# Patient Record
Sex: MALE | Race: ASIAN | NOT HISPANIC OR LATINO | Employment: UNEMPLOYED | URBAN - METROPOLITAN AREA
[De-identification: names, ages, dates, MRNs, and addresses within clinical notes are randomized per-mention and may not be internally consistent; named-entity substitution may affect disease eponyms.]

---

## 2018-10-29 ENCOUNTER — HOSPITAL ENCOUNTER (EMERGENCY)
Facility: HOSPITAL | Age: 23
Discharge: HOME | End: 2018-10-29
Attending: EMERGENCY MEDICINE
Payer: MEDICAID

## 2018-10-29 VITALS
HEIGHT: 64 IN | OXYGEN SATURATION: 100 % | DIASTOLIC BLOOD PRESSURE: 60 MMHG | RESPIRATION RATE: 18 BRPM | BODY MASS INDEX: 25.78 KG/M2 | TEMPERATURE: 99 F | WEIGHT: 151 LBS | HEART RATE: 80 BPM | SYSTOLIC BLOOD PRESSURE: 133 MMHG

## 2018-10-29 DIAGNOSIS — F15.10 AMPHETAMINE ABUSE: Primary | ICD-10-CM

## 2018-10-29 DIAGNOSIS — R44.3 HALLUCINATIONS: ICD-10-CM

## 2018-10-29 LAB
ALBUMIN SERPL BCP-MCNC: 5.2 G/DL
ALP SERPL-CCNC: 53 U/L
ALT SERPL W/O P-5'-P-CCNC: 17 U/L
AMPHET+METHAMPHET UR QL: NORMAL
ANION GAP SERPL CALC-SCNC: 16 MMOL/L
AST SERPL-CCNC: 31 U/L
BARBITURATES UR QL SCN>200 NG/ML: NEGATIVE
BASOPHILS # BLD AUTO: 0.01 K/UL
BASOPHILS NFR BLD: 0.1 %
BENZODIAZ UR QL SCN>200 NG/ML: NEGATIVE
BILIRUB SERPL-MCNC: 1.5 MG/DL
BUN SERPL-MCNC: 14 MG/DL
BZE UR QL SCN: NEGATIVE
CALCIUM SERPL-MCNC: 10.1 MG/DL
CANNABINOIDS UR QL SCN: NEGATIVE
CHLORIDE SERPL-SCNC: 100 MMOL/L
CO2 SERPL-SCNC: 20 MMOL/L
CREAT SERPL-MCNC: 1 MG/DL
CREAT UR-MCNC: 148.2 MG/DL
DIFFERENTIAL METHOD: ABNORMAL
EOSINOPHIL # BLD AUTO: 0 K/UL
EOSINOPHIL NFR BLD: 0 %
ERYTHROCYTE [DISTWIDTH] IN BLOOD BY AUTOMATED COUNT: 15.1 %
EST. GFR  (AFRICAN AMERICAN): >60 ML/MIN/1.73 M^2
EST. GFR  (NON AFRICAN AMERICAN): >60 ML/MIN/1.73 M^2
ETHANOL SERPL-MCNC: <10 MG/DL
GLUCOSE SERPL-MCNC: 140 MG/DL
HCT VFR BLD AUTO: 40.3 %
HGB BLD-MCNC: 13.1 G/DL
LYMPHOCYTES # BLD AUTO: 1.3 K/UL
LYMPHOCYTES NFR BLD: 14.4 %
MCH RBC QN AUTO: 21.4 PG
MCHC RBC AUTO-ENTMCNC: 32.5 G/DL
MCV RBC AUTO: 66 FL
METHADONE UR QL SCN>300 NG/ML: NEGATIVE
MONOCYTES # BLD AUTO: 0.7 K/UL
MONOCYTES NFR BLD: 8.1 %
NEUTROPHILS # BLD AUTO: 7.1 K/UL
NEUTROPHILS NFR BLD: 77.6 %
OPIATES UR QL SCN: NEGATIVE
PCP UR QL SCN>25 NG/ML: NEGATIVE
PLATELET # BLD AUTO: 270 K/UL
PMV BLD AUTO: 9.8 FL
POTASSIUM SERPL-SCNC: 3 MMOL/L
PROT SERPL-MCNC: 9.2 G/DL
RBC # BLD AUTO: 6.13 M/UL
SODIUM SERPL-SCNC: 136 MMOL/L
TOXICOLOGY INFORMATION: NORMAL
TSH SERPL DL<=0.005 MIU/L-ACNC: 1.39 UIU/ML
WBC # BLD AUTO: 9.11 K/UL

## 2018-10-29 PROCEDURE — 96361 HYDRATE IV INFUSION ADD-ON: CPT

## 2018-10-29 PROCEDURE — 63600175 PHARM REV CODE 636 W HCPCS: Performed by: EMERGENCY MEDICINE

## 2018-10-29 PROCEDURE — 80053 COMPREHEN METABOLIC PANEL: CPT

## 2018-10-29 PROCEDURE — 25000003 PHARM REV CODE 250: Performed by: EMERGENCY MEDICINE

## 2018-10-29 PROCEDURE — 84443 ASSAY THYROID STIM HORMONE: CPT

## 2018-10-29 PROCEDURE — 80320 DRUG SCREEN QUANTALCOHOLS: CPT

## 2018-10-29 PROCEDURE — 96374 THER/PROPH/DIAG INJ IV PUSH: CPT

## 2018-10-29 PROCEDURE — 80307 DRUG TEST PRSMV CHEM ANLYZR: CPT

## 2018-10-29 PROCEDURE — 99284 EMERGENCY DEPT VISIT MOD MDM: CPT | Mod: 25

## 2018-10-29 PROCEDURE — 85025 COMPLETE CBC W/AUTO DIFF WBC: CPT

## 2018-10-29 RX ORDER — ALPRAZOLAM 0.5 MG/1
2 TABLET ORAL
Status: DISCONTINUED | OUTPATIENT
Start: 2018-10-29 | End: 2018-10-29

## 2018-10-29 RX ORDER — OLANZAPINE 5 MG/1
10 TABLET, ORALLY DISINTEGRATING ORAL NIGHTLY
Status: DISCONTINUED | OUTPATIENT
Start: 2018-10-29 | End: 2018-10-29 | Stop reason: HOSPADM

## 2018-10-29 RX ORDER — LORAZEPAM 2 MG/ML
2 INJECTION INTRAMUSCULAR
Status: COMPLETED | OUTPATIENT
Start: 2018-10-29 | End: 2018-10-29

## 2018-10-29 RX ORDER — OLANZAPINE 5 MG/1
10 TABLET, ORALLY DISINTEGRATING ORAL NIGHTLY
Status: DISCONTINUED | OUTPATIENT
Start: 2018-10-29 | End: 2018-10-29

## 2018-10-29 RX ADMIN — SODIUM CHLORIDE 1000 ML: 0.9 INJECTION, SOLUTION INTRAVENOUS at 03:10

## 2018-10-29 RX ADMIN — LORAZEPAM 2 MG: 2 INJECTION INTRAMUSCULAR; INTRAVENOUS at 03:10

## 2018-10-29 RX ADMIN — OLANZAPINE 10 MG: 5 TABLET, ORALLY DISINTEGRATING ORAL at 03:10

## 2018-10-29 NOTE — ED PROVIDER NOTES
"Encounter Date: 10/29/2018       History     Chief Complaint   Patient presents with    Paranoid     Pt reports feeling Paranoid.  States he used "Crystal Meth" yesterday.       Patient presents c/o paranoia since doing methamphetamine yesterday. States that he is hearing voices and thinks someone is watching him. No other drug use. He drank alcohol yesterday. He has had similar symptoms in past associated with drug use. Admits to h/o anxiety. He is on no prescription medications. He hs not slept in 2 days.       The history is provided by the patient.   General Illness    The current episode started yesterday. The problem occurs frequently. The problem has been gradually worsening. Nothing relieves the symptoms. Nothing aggravates the symptoms. Pertinent negatives include no fever, no double vision, no abdominal pain, no diarrhea, no nausea, no vomiting, no congestion, no headaches, no sore throat, no muscle aches, no neck pain, no cough, no shortness of breath and no pain.     Review of patient's allergies indicates:  No Known Allergies  History reviewed. No pertinent past medical history.  History reviewed. No pertinent surgical history.  History reviewed. No pertinent family history.  Social History     Tobacco Use    Smoking status: Current Every Day Smoker     Types: Cigarettes    Smokeless tobacco: Current User   Substance Use Topics    Alcohol use: No     Frequency: Never    Drug use: Yes     Frequency: 2.0 times per week     Types: Methamphetamines     Review of Systems   Constitutional: Negative for chills, diaphoresis and fever.   HENT: Negative for congestion and sore throat.    Eyes: Negative.  Negative for double vision, pain and visual disturbance.   Respiratory: Negative for cough, chest tightness and shortness of breath.    Cardiovascular: Negative for chest pain and palpitations.   Gastrointestinal: Negative for abdominal pain, diarrhea, nausea and vomiting.        NO melena or rectal bleeding "   Musculoskeletal: Negative for arthralgias, back pain and neck pain.   Skin: Negative for wound.   Neurological: Negative for dizziness, syncope, speech difficulty, weakness, numbness and headaches.        No numbness   Psychiatric/Behavioral: Positive for confusion, hallucinations and sleep disturbance. Negative for agitation, self-injury and suicidal ideas. The patient is nervous/anxious. The patient is not hyperactive.    All other systems reviewed and are negative.      Physical Exam     Initial Vitals [10/29/18 0234]   BP Pulse Resp Temp SpO2   (!) 174/86 (!) 124 18 98.3 °F (36.8 °C) 99 %      MAP       --         Physical Exam    Nursing note and vitals reviewed.  Constitutional: He appears well-developed and well-nourished. He is not diaphoretic. No distress.   HENT:   Head: Normocephalic and atraumatic.   Mouth/Throat: Oropharynx is clear and moist.   Eyes: Conjunctivae and EOM are normal. Pupils are equal, round, and reactive to light. Right eye exhibits no discharge. Left eye exhibits no discharge. No scleral icterus.   Neck: Neck supple. No tracheal deviation present.   Cardiovascular: Regular rhythm and normal heart sounds.   No murmur heard.  Tachycardic.   Pulmonary/Chest: Breath sounds normal. No stridor. No respiratory distress. He has no wheezes. He has no rhonchi. He has no rales. He exhibits no tenderness.   Abdominal: Soft. He exhibits no distension and no mass. There is no tenderness. There is no rebound and no guarding.   Musculoskeletal: Normal range of motion. He exhibits no edema or tenderness.   Neurological: He is alert and oriented to person, place, and time. He has normal strength. No cranial nerve deficit.   Skin: Skin is warm and dry. No rash noted.   Psychiatric:   Anxious. Flat affect. Admits to auditory hallucinations. No SI or HI.          ED Course   Procedures  Labs Reviewed   CBC W/ AUTO DIFFERENTIAL - Abnormal; Notable for the following components:       Result Value     Hemoglobin 13.1 (*)     MCV 66 (*)     MCH 21.4 (*)     RDW 15.1 (*)     Gran% 77.6 (*)     Lymph% 14.4 (*)     All other components within normal limits   COMPREHENSIVE METABOLIC PANEL - Abnormal; Notable for the following components:    Potassium 3.0 (*)     CO2 20 (*)     Glucose 140 (*)     Total Protein 9.2 (*)     Total Bilirubin 1.5 (*)     Alkaline Phosphatase 53 (*)     All other components within normal limits   TSH   DRUG SCREEN PANEL, URINE EMERGENCY   ALCOHOL,MEDICAL (ETHANOL)          Imaging Results    None          Medical Decision Making:   Differential Diagnosis:   Drug abuse, psychosis  Clinical Tests:   Lab Tests: Reviewed  ED Management:  0400: Patient sleeping after medications. HR normal. Will OBS. Suspect symptoms due to drug abuse. Once awake, if he is still exhibiting symptoms of psychosis he may need to be committed for psychiatric evaluation.     0600: Patient signed out to Dr. Meadows at shift change for further OBS and repeat evaluation.     I received sign-out on the patient by previous provider.  Please look at his note above.  S per sign out, patient came in secondary to hallucinations after using methamphetamines with most likely the reason on hallucinations being his drug use.  Patient has been observed in the emergency department now for multiple hours with normalized vitals and not hypoxic after sedative medications.  Pending urine drug screen on the patient.  Reviewed his current labs.    6:30 AM  Patient is sleeping comfortably    7:10 AM  Patient is sleeping comfortably    9:00 AM  Patient eloped more awake after receiving sedative medications previously by previous provider.  Patient denies any suicidal ideation, homicidal ideation, or hallucinations.  Will have patient tolerate p.o. and ambulate.  If able to will be discharged with outpatient drug abuse resources.  I instructed the patient to please stop any drug use his inhaler time get counseling and follow up with primary  care.  I did give the patient a primary care physician.  Does not fit PEC at this time    Patient's tolerate p.o. and is ambulating.  To be discharged.  No complaints.    I discussed with the patient the diagnosis, treatment plan, indications for return to the emergency department, and for expected follow-up. The patient verbalized an understanding. The patient is asked if there are any questions or concerns. We discuss the case, until all issues are addressed to the patient's satisfaction. Patient understands and is agreeable to the plan.   Ga Meadows                          Clinical Impression:   The primary encounter diagnosis was Amphetamine abuse. A diagnosis of Hallucinations was also pertinent to this visit.                             Ga Meadows MD  10/29/18 1002

## 2018-10-29 NOTE — ED TRIAGE NOTES
"Patient arrived to Ed from home via personal transportation. Pt stated "I'm paranoid , I took Crystal Meth." Denies any vomiting, nausea.  "

## 2018-10-29 NOTE — ED NOTES
First contact with patient, patient is asleep, arouses to voice.  Pt's VSS, and patient is in no acute distress at this time.

## 2019-06-04 ENCOUNTER — HOSPITAL ENCOUNTER (EMERGENCY)
Facility: HOSPITAL | Age: 24
Discharge: HOME | End: 2019-06-05
Attending: EMERGENCY MEDICINE
Payer: MEDICAID

## 2019-06-04 DIAGNOSIS — R00.0 TACHYCARDIA: ICD-10-CM

## 2019-06-04 DIAGNOSIS — F14.920 COCAINE INTOXICATION WITHOUT COMPLICATION: Primary | ICD-10-CM

## 2019-06-04 PROCEDURE — 93010 ELECTROCARDIOGRAM REPORT: CPT | Mod: ,,, | Performed by: INTERNAL MEDICINE

## 2019-06-04 PROCEDURE — 96361 HYDRATE IV INFUSION ADD-ON: CPT

## 2019-06-04 PROCEDURE — 93010 EKG 12-LEAD: ICD-10-PCS | Mod: ,,, | Performed by: INTERNAL MEDICINE

## 2019-06-04 PROCEDURE — 96376 TX/PRO/DX INJ SAME DRUG ADON: CPT

## 2019-06-04 PROCEDURE — 93005 ELECTROCARDIOGRAM TRACING: CPT

## 2019-06-04 PROCEDURE — 99285 EMERGENCY DEPT VISIT HI MDM: CPT | Mod: 25

## 2019-06-04 PROCEDURE — 96374 THER/PROPH/DIAG INJ IV PUSH: CPT

## 2019-06-04 RX ORDER — LORAZEPAM 2 MG/ML
2 INJECTION INTRAMUSCULAR
Status: COMPLETED | OUTPATIENT
Start: 2019-06-05 | End: 2019-06-05

## 2019-06-05 VITALS
HEIGHT: 64 IN | RESPIRATION RATE: 18 BRPM | OXYGEN SATURATION: 99 % | SYSTOLIC BLOOD PRESSURE: 116 MMHG | BODY MASS INDEX: 22.2 KG/M2 | DIASTOLIC BLOOD PRESSURE: 72 MMHG | WEIGHT: 130 LBS | TEMPERATURE: 98 F | HEART RATE: 71 BPM

## 2019-06-05 LAB
ALBUMIN SERPL BCP-MCNC: 4.7 G/DL (ref 3.5–5.2)
ALP SERPL-CCNC: 50 U/L (ref 55–135)
ALT SERPL W/O P-5'-P-CCNC: 14 U/L (ref 10–44)
AMPHET+METHAMPHET UR QL: NEGATIVE
ANION GAP SERPL CALC-SCNC: 13 MMOL/L (ref 8–16)
AST SERPL-CCNC: 21 U/L (ref 10–40)
BARBITURATES UR QL SCN>200 NG/ML: NEGATIVE
BASOPHILS # BLD AUTO: 0.01 K/UL (ref 0–0.2)
BASOPHILS NFR BLD: 0.1 % (ref 0–1.9)
BENZODIAZ UR QL SCN>200 NG/ML: NEGATIVE
BILIRUB SERPL-MCNC: 0.6 MG/DL (ref 0.1–1)
BUN SERPL-MCNC: 11 MG/DL (ref 6–20)
BZE UR QL SCN: NORMAL
CALCIUM SERPL-MCNC: 9.5 MG/DL (ref 8.7–10.5)
CANNABINOIDS UR QL SCN: NEGATIVE
CHLORIDE SERPL-SCNC: 102 MMOL/L (ref 95–110)
CO2 SERPL-SCNC: 22 MMOL/L (ref 23–29)
CREAT SERPL-MCNC: 1.1 MG/DL (ref 0.5–1.4)
CREAT UR-MCNC: 67.5 MG/DL (ref 23–375)
DIFFERENTIAL METHOD: ABNORMAL
EOSINOPHIL # BLD AUTO: 0 K/UL (ref 0–0.5)
EOSINOPHIL NFR BLD: 0 % (ref 0–8)
ERYTHROCYTE [DISTWIDTH] IN BLOOD BY AUTOMATED COUNT: 13.8 % (ref 11.5–14.5)
EST. GFR  (AFRICAN AMERICAN): >60 ML/MIN/1.73 M^2
EST. GFR  (NON AFRICAN AMERICAN): >60 ML/MIN/1.73 M^2
ETHANOL SERPL-MCNC: <10 MG/DL
GLUCOSE SERPL-MCNC: 209 MG/DL (ref 70–110)
HCT VFR BLD AUTO: 39.6 % (ref 40–54)
HGB BLD-MCNC: 12.6 G/DL (ref 14–18)
HYPOCHROMIA BLD QL SMEAR: ABNORMAL
LYMPHOCYTES # BLD AUTO: 0.5 K/UL (ref 1–4.8)
LYMPHOCYTES NFR BLD: 7 % (ref 18–48)
MCH RBC QN AUTO: 21.8 PG (ref 27–31)
MCHC RBC AUTO-ENTMCNC: 31.8 G/DL (ref 32–36)
MCV RBC AUTO: 69 FL (ref 82–98)
METHADONE UR QL SCN>300 NG/ML: NEGATIVE
MONOCYTES # BLD AUTO: 0.2 K/UL (ref 0.3–1)
MONOCYTES NFR BLD: 3.2 % (ref 4–15)
NEUTROPHILS # BLD AUTO: 6.8 K/UL (ref 1.8–7.7)
NEUTROPHILS NFR BLD: 90 % (ref 38–73)
OPIATES UR QL SCN: NEGATIVE
OVALOCYTES BLD QL SMEAR: ABNORMAL
PCP UR QL SCN>25 NG/ML: NEGATIVE
PLATELET # BLD AUTO: 309 K/UL (ref 150–350)
PMV BLD AUTO: 9.4 FL (ref 9.2–12.9)
POLYCHROMASIA BLD QL SMEAR: ABNORMAL
POTASSIUM SERPL-SCNC: 3.7 MMOL/L (ref 3.5–5.1)
PROT SERPL-MCNC: 8.5 G/DL (ref 6–8.4)
RBC # BLD AUTO: 5.77 M/UL (ref 4.6–6.2)
SODIUM SERPL-SCNC: 137 MMOL/L (ref 136–145)
TARGETS BLD QL SMEAR: ABNORMAL
TOXICOLOGY INFORMATION: NORMAL
TROPONIN I SERPL DL<=0.01 NG/ML-MCNC: 0.01 NG/ML (ref 0–0.03)
WBC # BLD AUTO: 7.55 K/UL (ref 3.9–12.7)

## 2019-06-05 PROCEDURE — 80320 DRUG SCREEN QUANTALCOHOLS: CPT

## 2019-06-05 PROCEDURE — 80053 COMPREHEN METABOLIC PANEL: CPT

## 2019-06-05 PROCEDURE — 85025 COMPLETE CBC W/AUTO DIFF WBC: CPT

## 2019-06-05 PROCEDURE — 25000003 PHARM REV CODE 250: Performed by: EMERGENCY MEDICINE

## 2019-06-05 PROCEDURE — 84484 ASSAY OF TROPONIN QUANT: CPT

## 2019-06-05 PROCEDURE — 63600175 PHARM REV CODE 636 W HCPCS: Performed by: EMERGENCY MEDICINE

## 2019-06-05 PROCEDURE — 80307 DRUG TEST PRSMV CHEM ANLYZR: CPT

## 2019-06-05 RX ORDER — LORAZEPAM 2 MG/ML
2 INJECTION INTRAMUSCULAR
Status: COMPLETED | OUTPATIENT
Start: 2019-06-05 | End: 2019-06-05

## 2019-06-05 RX ADMIN — LORAZEPAM 2 MG: 2 INJECTION INTRAMUSCULAR; INTRAVENOUS at 12:06

## 2019-06-05 RX ADMIN — LORAZEPAM 2 MG: 2 INJECTION INTRAMUSCULAR; INTRAVENOUS at 01:06

## 2019-06-05 RX ADMIN — SODIUM CHLORIDE 1000 ML: 0.9 INJECTION, SOLUTION INTRAVENOUS at 12:06

## 2019-06-05 RX ADMIN — SODIUM CHLORIDE 1000 ML: 0.9 INJECTION, SOLUTION INTRAVENOUS at 02:06

## 2019-06-05 NOTE — ED NOTES
Pt AAOx4; pt speech clear and coherent; pt ambulates with slow steady gait, no assist needed; pt denies any complaints at this time; no distress noted;

## 2019-06-05 NOTE — ED NOTES
Pt reminded again about urine sample needed; pt reports that he does not need to urinate at this time but will try later

## 2019-06-05 NOTE — ED TRIAGE NOTES
"Pt reports to ED via personal transportation; pt reports that he wants to be arrested because he is recently homeless as of today; pt states "I have no where to go"; pt reports that he also smokes crack and last used about 1hr ago; pt reports that he wants to be arrested so he would have a place to stay and so he could have some help getting off the crack; pt also reports that he occasionally uses meth and drinks alcohol; pt denies hallucinations, SI, or HI at this time; pt denies any medical hx; pt tachycardic but denies any chest pain or SOB; pt calm, cooperative, and has flat affect; no acute distress noted;  "

## 2019-06-05 NOTE — ED PROVIDER NOTES
"Encounter Date: 6/4/2019       History     Chief Complaint   Patient presents with    Psychiatric Evaluation     "I want to turn myself in to the police, I smoked crack, I need help."  Denies SI or HI     Patient presents asking for help getting off crack cocaine.  He states that he last used about 1 hr ago.  States using crack cocaine for days.  States he does not have anywhere to stay.  Patient is asking us to get have him arrested and put in group home soaking of crack and had a place to sleep. He denies alcohol intoxication.  He denies using any other drugs tonight.  He denies chest pain. Denies shortness of breath. He denies nausea or vomiting. Denies headache. Denies abdominal pain. Patient denies suicidal ideations.  He denies homicidal ideations.  He denies auditory or visual hallucinations.  denies any psychiatric history.  Patient states that he has never been to drug rehab.        Review of patient's allergies indicates:  No Known Allergies  No past medical history on file.  No past surgical history on file.  No family history on file.  Social History     Tobacco Use    Smoking status: Current Every Day Smoker     Types: Cigarettes    Smokeless tobacco: Current User   Substance Use Topics    Alcohol use: No     Frequency: Never    Drug use: Yes     Frequency: 2.0 times per week     Types: Methamphetamines     Review of Systems   Constitutional: Positive for diaphoresis. Negative for chills and fever.   HENT: Negative for congestion and sore throat.    Eyes: Negative.    Respiratory: Negative for cough and shortness of breath.    Cardiovascular: Negative for chest pain.   Gastrointestinal: Negative for abdominal pain, diarrhea, nausea and vomiting.        NO melena or rectal bleeding   Musculoskeletal: Negative for back pain.   Skin: Negative for rash and wound.   Neurological: Negative for dizziness, tremors, seizures, syncope, facial asymmetry, speech difficulty, weakness, numbness and headaches.        " No numbness   Psychiatric/Behavioral: Positive for dysphoric mood. Negative for agitation, behavioral problems, confusion, hallucinations, sleep disturbance and suicidal ideas. The patient is nervous/anxious.    All other systems reviewed and are negative.      Physical Exam     Initial Vitals [06/04/19 2331]   BP Pulse Resp Temp SpO2   138/73 (!) 136 20 98.8 °F (37.1 °C) 98 %      MAP       --         Physical Exam    Nursing note and vitals reviewed.  Constitutional: He appears well-developed and well-nourished. He is diaphoretic. No distress.   HENT:   Head: Normocephalic and atraumatic.   Nose: Nose normal.   Mouth/Throat: Oropharynx is clear and moist.   Eyes: Conjunctivae and EOM are normal. Pupils are equal, round, and reactive to light. Right eye exhibits no discharge. Left eye exhibits no discharge. No scleral icterus.   Neck: Neck supple. No tracheal deviation present.   Cardiovascular: Regular rhythm and normal heart sounds.   No murmur heard.  Tachycardia   Pulmonary/Chest: Breath sounds normal. No stridor. No respiratory distress. He has no wheezes. He has no rhonchi. He has no rales.   Abdominal: Soft. He exhibits no distension. There is no tenderness. There is no rebound and no guarding.   Musculoskeletal: Normal range of motion. He exhibits no edema or tenderness.   Neurological: He is alert and oriented to person, place, and time. He has normal strength. No cranial nerve deficit. GCS score is 15. GCS eye subscore is 4. GCS verbal subscore is 5. GCS motor subscore is 6.   Skin: Skin is warm. No rash noted.   Psychiatric: Thought content normal.   Dysphoric mood.  Flat affect. No suicidal ideations.  No homicidal ideations.  No active hallucinations or delusions.  Patient is intoxicated.  patient is mildly anxious.         ED Course   Procedures  Labs Reviewed   CBC W/ AUTO DIFFERENTIAL   COMPREHENSIVE METABOLIC PANEL   ALCOHOL,MEDICAL (ETHANOL)   DRUG SCREEN PANEL, URINE EMERGENCY   TROPONIN I      EKG Readings: (Independently Interpreted)   Initial Reading: No STEMI. Rhythm: Sinus Tachycardia. Heart Rate: 124. Ectopy: No Ectopy. ST Segments: Non-Specific ST Segment Depression. T Waves: Normal. T Waves Flipped: II, III, AVF, V3, V4, V5 and V6. Axis: Normal. Q Waves: II, III and AVF.       Imaging Results    None          Medical Decision Making:   Differential Diagnosis:   Depression, anxiety, cocaine intoxication, dehydration  Clinical Tests:   Lab Tests: Ordered and Reviewed  The following lab test(s) were unremarkable: CMP, CBC and Troponin  Radiological Study: Reviewed and Ordered  Medical Tests: Ordered and Reviewed  ED Management:  0300: No lab abnormalities.  EKG does not show signs of ischemia.  Troponin is normal. Patient denies chest pain. Heart rates improved after Ativan IV fluid.  Once patient more lucid he will be discharged with resource information for drug rehab.  Patient continues to denies suicidal or homicidal ideations.  He continues to deny hallucinations.  Patient is not gravely disabled.  No need for PEC.                      Clinical Impression:       ICD-10-CM ICD-9-CM   1. Cocaine intoxication without complication F14.920 292.89     305.60   2. Tachycardia R00.0 785.0         Disposition:   Disposition: Discharged  Condition: Stable                        Kyrie Brock MD  06/05/19 0305

## 2019-06-06 ENCOUNTER — HOSPITAL ENCOUNTER (EMERGENCY)
Facility: HOSPITAL | Age: 24
Discharge: PSYCHIATRIC OR SUBSTANCE USE FACILITY | End: 2019-06-07
Attending: EMERGENCY MEDICINE
Payer: MEDICAID

## 2019-06-06 VITALS
HEART RATE: 84 BPM | WEIGHT: 140 LBS | DIASTOLIC BLOOD PRESSURE: 78 MMHG | RESPIRATION RATE: 20 BRPM | OXYGEN SATURATION: 99 % | BODY MASS INDEX: 23.9 KG/M2 | TEMPERATURE: 99 F | HEIGHT: 64 IN | SYSTOLIC BLOOD PRESSURE: 126 MMHG

## 2019-06-06 DIAGNOSIS — F19.94 SUBSTANCE INDUCED MOOD DISORDER: ICD-10-CM

## 2019-06-06 DIAGNOSIS — F19.10 POLYSUBSTANCE ABUSE: Primary | ICD-10-CM

## 2019-06-06 DIAGNOSIS — R45.851 SUICIDAL IDEATION: ICD-10-CM

## 2019-06-06 LAB
ALBUMIN SERPL BCP-MCNC: 4.5 G/DL (ref 3.5–5.2)
ALP SERPL-CCNC: 47 U/L (ref 55–135)
ALT SERPL W/O P-5'-P-CCNC: 15 U/L (ref 10–44)
AMPHET+METHAMPHET UR QL: NEGATIVE
ANION GAP SERPL CALC-SCNC: 10 MMOL/L (ref 8–16)
APAP SERPL-MCNC: <3 UG/ML (ref 10–20)
AST SERPL-CCNC: 19 U/L (ref 10–40)
BARBITURATES UR QL SCN>200 NG/ML: NEGATIVE
BASOPHILS # BLD AUTO: 0.02 K/UL (ref 0–0.2)
BASOPHILS NFR BLD: 0.2 % (ref 0–1.9)
BENZODIAZ UR QL SCN>200 NG/ML: NEGATIVE
BILIRUB SERPL-MCNC: 0.7 MG/DL (ref 0.1–1)
BILIRUB UR QL STRIP: NEGATIVE
BUN SERPL-MCNC: 13 MG/DL (ref 6–20)
BZE UR QL SCN: NORMAL
CALCIUM SERPL-MCNC: 9.7 MG/DL (ref 8.7–10.5)
CANNABINOIDS UR QL SCN: NEGATIVE
CHLORIDE SERPL-SCNC: 105 MMOL/L (ref 95–110)
CLARITY UR REFRACT.AUTO: CLEAR
CO2 SERPL-SCNC: 23 MMOL/L (ref 23–29)
COLOR UR AUTO: NORMAL
CREAT SERPL-MCNC: 1 MG/DL (ref 0.5–1.4)
CREAT UR-MCNC: 76 MG/DL (ref 23–375)
DIFFERENTIAL METHOD: ABNORMAL
EOSINOPHIL # BLD AUTO: 0.1 K/UL (ref 0–0.5)
EOSINOPHIL NFR BLD: 1.1 % (ref 0–8)
ERYTHROCYTE [DISTWIDTH] IN BLOOD BY AUTOMATED COUNT: 14 % (ref 11.5–14.5)
EST. GFR  (AFRICAN AMERICAN): >60 ML/MIN/1.73 M^2
EST. GFR  (NON AFRICAN AMERICAN): >60 ML/MIN/1.73 M^2
ETHANOL SERPL-MCNC: <10 MG/DL
GLUCOSE SERPL-MCNC: 104 MG/DL (ref 70–110)
GLUCOSE UR QL STRIP: NEGATIVE
HCT VFR BLD AUTO: 40.5 % (ref 40–54)
HGB BLD-MCNC: 12.7 G/DL (ref 14–18)
HGB UR QL STRIP: NEGATIVE
IMM GRANULOCYTES # BLD AUTO: 0.06 K/UL (ref 0–0.04)
IMM GRANULOCYTES NFR BLD AUTO: 0.6 % (ref 0–0.5)
KETONES UR QL STRIP: NEGATIVE
LEUKOCYTE ESTERASE UR QL STRIP: NEGATIVE
LYMPHOCYTES # BLD AUTO: 2.2 K/UL (ref 1–4.8)
LYMPHOCYTES NFR BLD: 20.4 % (ref 18–48)
MCH RBC QN AUTO: 21.2 PG (ref 27–31)
MCHC RBC AUTO-ENTMCNC: 31.4 G/DL (ref 32–36)
MCV RBC AUTO: 68 FL (ref 82–98)
METHADONE UR QL SCN>300 NG/ML: NEGATIVE
MONOCYTES # BLD AUTO: 0.8 K/UL (ref 0.3–1)
MONOCYTES NFR BLD: 7.2 % (ref 4–15)
NEUTROPHILS # BLD AUTO: 7.5 K/UL (ref 1.8–7.7)
NEUTROPHILS NFR BLD: 70.5 % (ref 38–73)
NITRITE UR QL STRIP: NEGATIVE
NRBC BLD-RTO: 0 /100 WBC
OPIATES UR QL SCN: NEGATIVE
PCP UR QL SCN>25 NG/ML: NEGATIVE
PH UR STRIP: 7 [PH] (ref 5–8)
PLATELET # BLD AUTO: 338 K/UL (ref 150–350)
PMV BLD AUTO: 10.2 FL (ref 9.2–12.9)
POTASSIUM SERPL-SCNC: 3.7 MMOL/L (ref 3.5–5.1)
PROT SERPL-MCNC: 7.9 G/DL (ref 6–8.4)
PROT UR QL STRIP: NEGATIVE
RBC # BLD AUTO: 5.98 M/UL (ref 4.6–6.2)
SODIUM SERPL-SCNC: 138 MMOL/L (ref 136–145)
SP GR UR STRIP: 1.01 (ref 1–1.03)
TOXICOLOGY INFORMATION: NORMAL
TSH SERPL DL<=0.005 MIU/L-ACNC: 1.31 UIU/ML (ref 0.4–4)
URN SPEC COLLECT METH UR: NORMAL
WBC # BLD AUTO: 10.58 K/UL (ref 3.9–12.7)

## 2019-06-06 PROCEDURE — 99285 EMERGENCY DEPT VISIT HI MDM: CPT

## 2019-06-06 PROCEDURE — 80329 ANALGESICS NON-OPIOID 1 OR 2: CPT

## 2019-06-06 PROCEDURE — 85025 COMPLETE CBC W/AUTO DIFF WBC: CPT

## 2019-06-06 PROCEDURE — 99284 PR EMERGENCY DEPT VISIT,LEVEL IV: ICD-10-PCS | Mod: ,,, | Performed by: EMERGENCY MEDICINE

## 2019-06-06 PROCEDURE — 81003 URINALYSIS AUTO W/O SCOPE: CPT | Mod: 59

## 2019-06-06 PROCEDURE — 80053 COMPREHEN METABOLIC PANEL: CPT

## 2019-06-06 PROCEDURE — 80320 DRUG SCREEN QUANTALCOHOLS: CPT

## 2019-06-06 PROCEDURE — 99284 EMERGENCY DEPT VISIT MOD MDM: CPT | Mod: ,,, | Performed by: EMERGENCY MEDICINE

## 2019-06-06 PROCEDURE — 80307 DRUG TEST PRSMV CHEM ANLYZR: CPT

## 2019-06-06 PROCEDURE — 84443 ASSAY THYROID STIM HORMONE: CPT

## 2019-06-06 PROCEDURE — 25000003 PHARM REV CODE 250: Performed by: EMERGENCY MEDICINE

## 2019-06-06 RX ORDER — DIAZEPAM 5 MG/1
5 TABLET ORAL
Status: COMPLETED | OUTPATIENT
Start: 2019-06-06 | End: 2019-06-06

## 2019-06-06 RX ADMIN — DIAZEPAM 5 MG: 5 TABLET ORAL at 09:06

## 2019-06-07 NOTE — ED NOTES
Patient discharged to [facility]    Patient transported by [facility] transport service  All belongings given to [facility] attendants    Patient escorted to transport vehicle by [facility] attendants x2,  Security officers x2 and writer

## 2019-06-07 NOTE — ED NOTES
Patient accepted by Charmaine at [facility] ([address], [place]) for the service of Dr. Schofield.  Report to be called to [phone], option 2.

## 2019-06-07 NOTE — ED PROVIDER NOTES
"Encounter Date: 6/6/2019    SCRIBE #1 NOTE: I, Sallie Garcia, am scribing for, and in the presence of,  Dr. Rich. I have scribed the following portions of the note - the Resident attestation.       History     Chief Complaint   Patient presents with    Mental Health Problem     +SI, plan to OD. arrives with PEC, and JPSO. states, "have nowhere to stay," reports noncompliance with medications.      Mr. Rodriguez is a 23 year old man with MDD, substance use (ice, crack) who presents with suicidal ideation. Per patient, he recently lost his job, house, gave away his car because he had plans to go to [place] to be back with his family. He states that his family does not want him to return. He says he barely sees his kids, doesn't know what to do. He "feels like there is nothing to live for", "doesn't want to live anymore", feels like "giving up". He doesn't know what to do. He said he has been clean for a month until last year when he started smoking ice on a weekly basis. He smokes crack and last used 2 nights ago. He is on probation for crystal meth. He is suppose to be on Lexapro but states that he stopped taking is last week because it felt like it was not working. At the [facility] program today, he told his fellow members and staff about thoughts to harm himself. Patient came with a signed PEC.    Of note, patient was seen at [facility] after he smoked crack and had no place to stay. Patient at that time was not PEC'ed as he had no SI/HI/AVH and did not appear gravely disabled.     The history is provided by the patient and medical records.     Review of patient's allergies indicates:  No Known Allergies  Past Medical History:   Diagnosis Date    Depression      History reviewed. No pertinent surgical history.  History reviewed. No pertinent family history.  Social History     Tobacco Use    Smoking status: Current Every Day Smoker     Types: Cigarettes    Smokeless tobacco: Current User   Substance Use " "Topics    Alcohol use: Yes     Frequency: Never     Comment: "sometimes daily"    Drug use: Yes     Frequency: 2.0 times per week     Types: Methamphetamines, Cocaine     Comment: crack     Review of Systems   Constitutional: Negative for chills and fever.   HENT: Negative for congestion and sore throat.    Eyes: Negative for photophobia and visual disturbance.   Respiratory: Negative for cough and shortness of breath.    Cardiovascular: Negative for chest pain and leg swelling.   Gastrointestinal: Negative for abdominal pain and nausea.   Genitourinary: Negative for difficulty urinating and dysuria.   Musculoskeletal: Negative for back pain and neck pain.   Skin: Negative for pallor and rash.   Neurological: Negative for dizziness and weakness.   Psychiatric/Behavioral: Positive for self-injury and suicidal ideas. Negative for agitation, confusion and hallucinations.       Physical Exam     Initial Vitals [06/06/19 1949]   BP Pulse Resp Temp SpO2   (!) 138/91 80 18 98.9 °F (37.2 °C) 98 %      MAP       --         Physical Exam    Nursing note and vitals reviewed.  Constitutional: He appears well-developed and well-nourished. No distress.   HENT:   Head: Normocephalic and atraumatic.   Eyes: EOM are normal.   Neck: Normal range of motion. Neck supple.   Cardiovascular: Normal rate and regular rhythm.   Pulmonary/Chest: Breath sounds normal. No respiratory distress. He has no wheezes.   Abdominal: Soft. He exhibits no distension.   Musculoskeletal: Normal range of motion. He exhibits no edema.   Neurological: He is alert and oriented to person, place, and time. GCS score is 15.   Skin: Skin is warm and dry.   Psychiatric:   Appears depressed. Flat affect. +SI. No homicidal ideations. No active hallucinations or delusions.          ED Course   Procedures  Labs Reviewed   CBC W/ AUTO DIFFERENTIAL - Abnormal; Notable for the following components:       Result Value    Hemoglobin 12.7 (*)     Mean Corpuscular Volume 68 " (*)     Mean Corpuscular Hemoglobin 21.2 (*)     Mean Corpuscular Hemoglobin Conc 31.4 (*)     Immature Granulocytes 0.6 (*)     Immature Grans (Abs) 0.06 (*)     All other components within normal limits   COMPREHENSIVE METABOLIC PANEL - Abnormal; Notable for the following components:    Alkaline Phosphatase 47 (*)     All other components within normal limits   ACETAMINOPHEN LEVEL - Abnormal; Notable for the following components:    Acetaminophen (Tylenol), Serum <3.0 (*)     All other components within normal limits   TSH   URINALYSIS, REFLEX TO URINE CULTURE    Narrative:     Preferred Collection Type->Urine, Clean Catch   ALCOHOL,MEDICAL (ETHANOL)   DRUG SCREEN PANEL, URINE EMERGENCY          Imaging Results    None          Medical Decision Making:   History:   Old Medical Records: I decided to obtain old medical records.  Initial Assessment:   Mr. Rodriguez is a 23 year old man with MDD, substance use (ice, crack) who presents with suicidal ideation. Patient is a danger to himself as he expresses suicidal thoughts. He was PEC'ed at [facility]. Will order labs to medically clear patient for inpatient Psych facility admission.  Clinical Tests:   Lab Tests: Ordered and Reviewed  ED Management:  Patient's labs largely unremarkable. Patient is medically clear for inpatient Psych facility transfer.    Patient seen, and case discussed with Dr. Rich.            Scribe Attestation:   Scribe #1: I performed the above scribed service and the documentation accurately describes the services I performed. I attest to the accuracy of the note.    Attending Attestation:   Physician Attestation Statement for Resident:  As the supervising MD   Physician Attestation Statement: I have personally seen and examined this patient.   I agree with the above history. -: 23 y.o. patient with polysubstance abuse, presents with signed PEC from group program. Patient endorses SI with plan. No active symptoms of withdrawal. Will monitor  closely, will give PO valium. Will medically clear for psychiatric placement.    As the supervising MD I agree with the above PE.    As the supervising MD I agree with the above treatment, course, plan, and disposition.  I have reviewed and agree with the residents interpretation of the following: lab data.                       Clinical Impression:       ICD-10-CM ICD-9-CM   1. Polysubstance abuse F19.10 305.90   2. Substance induced mood disorder F19.94 292.84   3. Suicidal ideation R45.851 V62.84         Disposition:   Disposition: Transferred  Condition: Stable         Vivienne Birmingham MD  Resident  06/06/19 2152       Melissa Rich MD  06/06/19 0068

## 2019-06-07 NOTE — ED NOTES
Patient identifiers verified and correct for Tod Rodriguez.    LOC: The patient is awake, alert and aware of environment. the patient is oriented x 3 and speaking quietly    APPEARANCE: Patient resting comfortably and in no acute distress, patient is clean and well groomed, patient's clothing is properly fastened.    SKIN: The skin is warm and dry, color consistent with ethnicity, patient has normal skin turgor and moist mucus membranes, skin intact, no breakdown or bruising noted.    MUSCULOSKELETAL: Patient moving all extremities spontaneously, no obvious swelling or deformities noted.    RESPIRATORY: Airway is open and patent, respirations are spontaneous, patient has a normal effort and rate, no accessory muscle use noted, bilateral breath sounds clear    CARDIAC: Patient has a normal rate and regular rhythm, no periphreal edema noted, capillary refill < 3 seconds.    ABDOMEN: Soft and non tender to palpation, no distention noted, normoactive bowel sounds present in all four quadrants.    NEUROLOGIC: PERRLA, 3 mm bilaterally, eyes open spontaneously,   behavior appropriate to situation, follows commands, facial expression symmetrical, bilateral hand grasp equal and even, purposeful motor response noted, normal sensation in all extremities when touched with a finger.

## 2019-06-07 NOTE — ED NOTES
Admit packet faxed to [facility], [facility], [facility], [facility], [facility], [facility], [facility], [facility], [facility]. Waiting for response.

## 2019-06-07 NOTE — ED NOTES
Admit packet faxed to [facility], [facility], [facility], and [facility].

## 2019-06-07 NOTE — ED TRIAGE NOTES
"Tod Rodriguez, a 23 y.o. male presents to the ED w/ complaint of " feelings of wanting to harm self" " my wife and children dont want me around" I lost everything"     Triage note:  Chief Complaint   Patient presents with    Mental Health Problem     +SI, plan to OD. arrives with PEC, and JPSO. states, "have nowhere to stay," reports noncompliance with medications.      Review of patient's allergies indicates:  No Known Allergies  No past medical history on file.    "